# Patient Record
Sex: MALE | Race: WHITE | NOT HISPANIC OR LATINO | URBAN - METROPOLITAN AREA
[De-identification: names, ages, dates, MRNs, and addresses within clinical notes are randomized per-mention and may not be internally consistent; named-entity substitution may affect disease eponyms.]

---

## 2023-08-15 ENCOUNTER — HOSPITAL ENCOUNTER (OUTPATIENT)
Dept: DATA CONVERSION | Facility: HOSPITAL | Age: 68
Discharge: HOME | End: 2023-08-15

## 2023-08-15 DIAGNOSIS — E78.49 OTHER HYPERLIPIDEMIA: ICD-10-CM

## 2023-08-15 DIAGNOSIS — Z00.00 ENCOUNTER FOR GENERAL ADULT MEDICAL EXAMINATION WITHOUT ABNORMAL FINDINGS: ICD-10-CM

## 2023-08-15 DIAGNOSIS — Z12.5 ENCOUNTER FOR SCREENING FOR MALIGNANT NEOPLASM OF PROSTATE: ICD-10-CM

## 2023-08-15 LAB
ALBUMIN SERPL-MCNC: 4.4 GM/DL (ref 3.5–5)
ALBUMIN/GLOB SERPL: 1.4 RATIO (ref 1.5–3)
ALP BLD-CCNC: 96 U/L (ref 35–125)
ALT SERPL-CCNC: 28 U/L (ref 5–40)
ANION GAP SERPL CALCULATED.3IONS-SCNC: 10 MMOL/L (ref 0–19)
APPEARANCE PLAS: CLEAR
AST SERPL-CCNC: 18 U/L (ref 5–40)
BACTERIA UR QL AUTO: NEGATIVE
BILIRUB SERPL-MCNC: 0.8 MG/DL (ref 0.1–1.2)
BILIRUB UR QL STRIP.AUTO: NEGATIVE
BUN SERPL-MCNC: 26 MG/DL (ref 8–25)
BUN/CREAT SERPL: 26 RATIO (ref 8–21)
CALCIUM SERPL-MCNC: 9.2 MG/DL (ref 8.5–10.4)
CHLORIDE SERPL-SCNC: 105 MMOL/L (ref 97–107)
CHOLEST SERPL-MCNC: 226 MG/DL (ref 133–200)
CHOLEST/HDLC SERPL: 4.2 RATIO
CLARITY UR: CLEAR
CO2 SERPL-SCNC: 24 MMOL/L (ref 24–31)
COLOR SPUN FLD: YELLOW
COLOR UR: NORMAL
CREAT SERPL-MCNC: 1 MG/DL (ref 0.4–1.6)
DEPRECATED RDW RBC AUTO: 33.4 FL (ref 37–54)
ERYTHROCYTE [DISTWIDTH] IN BLOOD BY AUTOMATED COUNT: 15.6 % (ref 11.7–15)
FASTING STATUS PATIENT QL REPORTED: ABNORMAL
GFR SERPL CREATININE-BSD FRML MDRD: 82 ML/MIN/1.73 M2
GLOBULIN SER-MCNC: 3.1 G/DL (ref 1.9–3.7)
GLUCOSE SERPL-MCNC: 95 MG/DL (ref 65–99)
GLUCOSE UR STRIP.AUTO-MCNC: NEGATIVE MG/DL
HCT VFR BLD AUTO: 37.4 % (ref 41–50)
HDLC SERPL-MCNC: 54 MG/DL
HGB BLD-MCNC: 11.5 GM/DL (ref 13.5–16.5)
HGB UR QL STRIP.AUTO: NORMAL /HPF (ref 0–3)
HGB UR QL: NEGATIVE
HYALINE CASTS UR QL AUTO: NORMAL /LPF
KETONES UR QL STRIP.AUTO: NEGATIVE
LDLC SERPL CALC-MCNC: 154 MG/DL (ref 65–130)
LEUKOCYTE ESTERASE UR QL STRIP.AUTO: NEGATIVE
MCH RBC QN AUTO: 19.9 PG (ref 26–34)
MCHC RBC AUTO-ENTMCNC: 30.7 % (ref 31–37)
MCV RBC AUTO: 64.6 FL (ref 80–100)
MICROSCOPIC (UA): NORMAL
NITRITE UR QL STRIP.AUTO: NEGATIVE
NRBC BLD-RTO: 0 /100 WBC
PH UR STRIP.AUTO: 5 [PH] (ref 4.6–8)
PLATELET # BLD AUTO: 287 K/UL (ref 150–450)
PMV BLD AUTO: 11.8 CU (ref 7–12.6)
POTASSIUM SERPL-SCNC: 4.9 MMOL/L (ref 3.4–5.1)
PROT SERPL-MCNC: 7.5 G/DL (ref 5.9–7.9)
PROT UR STRIP.AUTO-MCNC: NEGATIVE MG/DL
PSA SERPL-MCNC: 1.4 NG/ML (ref 0–4.1)
RBC # BLD AUTO: 5.79 M/UL (ref 4.5–5.5)
SODIUM SERPL-SCNC: 139 MMOL/L (ref 133–145)
SP GR UR STRIP.AUTO: 1.01 (ref 1–1.03)
SQUAMOUS UR QL AUTO: NORMAL /HPF
TRIGL SERPL-MCNC: 90 MG/DL (ref 40–150)
URINE CULTURE: NORMAL
UROBILINOGEN UR QL STRIP.AUTO: NORMAL MG/DL (ref 0–1)
WBC # BLD AUTO: 7.3 K/UL (ref 4.5–11)
WBC #/AREA URNS AUTO: NORMAL /HPF (ref 0–3)

## 2023-08-18 PROBLEM — H93.13 TINNITUS OF BOTH EARS: Status: ACTIVE | Noted: 2023-08-18

## 2023-08-18 PROBLEM — E78.49 OTHER HYPERLIPIDEMIA: Status: ACTIVE | Noted: 2023-08-18

## 2023-08-18 PROBLEM — F32.A DEPRESSION: Status: ACTIVE | Noted: 2023-08-18

## 2023-08-18 PROBLEM — M47.816 LUMBAR SPONDYLOSIS: Status: ACTIVE | Noted: 2023-08-18

## 2023-08-18 PROBLEM — D56.9 THALASSEMIA: Status: ACTIVE | Noted: 2023-08-18

## 2023-08-18 PROBLEM — H90.3 SENSORINEURAL HEARING LOSS, BILATERAL: Status: ACTIVE | Noted: 2023-08-18

## 2023-08-18 PROBLEM — G89.29 OTHER CHRONIC PAIN: Status: ACTIVE | Noted: 2023-08-18

## 2023-08-18 PROBLEM — G47.00 INSOMNIA: Status: ACTIVE | Noted: 2023-08-18

## 2023-08-18 PROBLEM — F41.9 ANXIETY: Status: ACTIVE | Noted: 2023-08-18

## 2023-08-18 PROBLEM — H53.9 VISUAL DISTURBANCE: Status: ACTIVE | Noted: 2023-08-18

## 2023-08-18 PROBLEM — K21.9 GASTROESOPHAGEAL REFLUX DISEASE: Status: ACTIVE | Noted: 2023-08-18

## 2023-08-18 PROBLEM — R06.00 DYSPNEA: Status: ACTIVE | Noted: 2023-08-18

## 2023-08-18 PROBLEM — M35.3 PMR (POLYMYALGIA RHEUMATICA) (MULTI): Status: ACTIVE | Noted: 2023-08-18

## 2023-08-18 PROBLEM — E55.9 VITAMIN D DEFICIENCY: Status: ACTIVE | Noted: 2023-08-18

## 2023-08-18 PROBLEM — H90.A22 SENSORINEURAL HEARING LOSS (SNHL) OF LEFT EAR WITH RESTRICTED HEARING OF RIGHT EAR: Status: ACTIVE | Noted: 2023-08-18

## 2023-08-18 PROBLEM — I20.9 ANGINA PECTORIS (CMS-HCC): Status: ACTIVE | Noted: 2023-08-18

## 2023-08-18 PROBLEM — I10 ESSENTIAL HYPERTENSION: Status: ACTIVE | Noted: 2023-08-18

## 2023-08-18 PROBLEM — I20.89 ANGINA AT REST (CMS-HCC): Status: ACTIVE | Noted: 2023-08-18

## 2023-08-18 PROBLEM — F41.1 GENERALIZED ANXIETY DISORDER: Status: ACTIVE | Noted: 2023-08-18

## 2023-08-18 PROBLEM — R03.0 ELEVATED BLOOD PRESSURE READING: Status: ACTIVE | Noted: 2023-08-18

## 2023-08-18 PROBLEM — M51.9 LUMBAR DISC DISEASE: Status: ACTIVE | Noted: 2023-08-18

## 2023-08-18 PROBLEM — R42 DIZZINESS: Status: ACTIVE | Noted: 2023-08-18

## 2023-08-18 PROBLEM — R07.89 CHEST DISCOMFORT: Status: ACTIVE | Noted: 2023-08-18

## 2023-08-18 PROBLEM — R20.2 ARM PARESTHESIA, LEFT: Status: ACTIVE | Noted: 2023-08-18

## 2023-08-18 PROBLEM — G44.039 EPISODIC PAROXYSMAL HEMICRANIA, NOT INTRACTABLE: Status: ACTIVE | Noted: 2023-08-18

## 2023-08-18 RX ORDER — CYCLOBENZAPRINE HCL 10 MG
1 TABLET ORAL 2 TIMES DAILY PRN
COMMUNITY
Start: 2023-08-11

## 2023-08-18 RX ORDER — LISINOPRIL 5 MG/1
1 TABLET ORAL DAILY
COMMUNITY
Start: 2021-10-04

## 2023-08-18 RX ORDER — CITALOPRAM 20 MG/1
1 TABLET, FILM COATED ORAL DAILY
COMMUNITY

## 2023-08-18 RX ORDER — ACETAMINOPHEN 500 MG
2 TABLET ORAL EVERY 8 HOURS PRN
COMMUNITY

## 2023-08-18 RX ORDER — PREDNISONE 1 MG/1
1 TABLET ORAL DAILY
COMMUNITY

## 2023-08-18 RX ORDER — PREDNISONE 10 MG/1
1 TABLET ORAL 2 TIMES DAILY
COMMUNITY
Start: 2023-08-11

## 2023-08-18 RX ORDER — ALPRAZOLAM 0.25 MG/1
1 TABLET ORAL 2 TIMES DAILY
COMMUNITY

## 2023-08-18 RX ORDER — LISINOPRIL 10 MG/1
1 TABLET ORAL DAILY
COMMUNITY

## 2023-08-18 RX ORDER — CITALOPRAM 10 MG/1
1 TABLET ORAL NIGHTLY
COMMUNITY
Start: 2021-11-09

## 2023-08-18 RX ORDER — NAPROXEN 500 MG/1
1 TABLET ORAL 2 TIMES DAILY PRN
COMMUNITY
Start: 2016-06-24

## 2023-08-18 RX ORDER — ISOSORBIDE MONONITRATE 10 MG/1
1 TABLET ORAL 2 TIMES DAILY
COMMUNITY
Start: 2021-06-15

## 2023-08-18 RX ORDER — ATORVASTATIN CALCIUM 10 MG/1
1 TABLET, FILM COATED ORAL DAILY
COMMUNITY
End: 2023-11-15 | Stop reason: SDUPTHER

## 2023-10-03 ENCOUNTER — TELEPHONE (OUTPATIENT)
Dept: PRIMARY CARE | Facility: CLINIC | Age: 68
End: 2023-10-03
Payer: MEDICARE

## 2023-10-03 ENCOUNTER — CLINICAL SUPPORT (OUTPATIENT)
Dept: PRIMARY CARE | Facility: CLINIC | Age: 68
End: 2023-10-03
Payer: MEDICARE

## 2023-10-03 DIAGNOSIS — Z23 NEED FOR PROPHYLACTIC VACCINATION AND INOCULATION AGAINST CHOLERA ALONE: ICD-10-CM

## 2023-10-03 PROCEDURE — 90471 IMMUNIZATION ADMIN: CPT | Performed by: FAMILY MEDICINE

## 2023-11-15 ENCOUNTER — TELEPHONE (OUTPATIENT)
Dept: PRIMARY CARE | Facility: CLINIC | Age: 68
End: 2023-11-15
Payer: MEDICARE

## 2023-11-15 DIAGNOSIS — Z76.0 MEDICATION REFILL: ICD-10-CM

## 2023-11-15 RX ORDER — ATORVASTATIN CALCIUM 10 MG/1
10 TABLET, FILM COATED ORAL DAILY
Qty: 90 TABLET | Refills: 1 | Status: SHIPPED | OUTPATIENT
Start: 2023-11-15 | End: 2023-11-17 | Stop reason: WASHOUT

## 2023-11-15 NOTE — TELEPHONE ENCOUNTER
Pt left VM stating that his atorvastatin is running low and he is going out of town soon and wants to know if he needs to continue atorvastatin or if he's done with it now.

## 2023-11-17 DIAGNOSIS — E78.49 OTHER HYPERLIPIDEMIA: ICD-10-CM

## 2023-11-17 RX ORDER — ATORVASTATIN CALCIUM 20 MG/1
20 TABLET, FILM COATED ORAL DAILY
Qty: 90 TABLET | Refills: 3 | Status: SHIPPED | OUTPATIENT
Start: 2023-11-17 | End: 2024-11-16

## 2023-11-17 RX ORDER — ATORVASTATIN CALCIUM 20 MG/1
20 TABLET, FILM COATED ORAL DAILY
COMMUNITY
End: 2023-11-17 | Stop reason: SDUPTHER

## 2023-11-17 NOTE — TELEPHONE ENCOUNTER
Pt states his wife went to  his rx for his atorvastatin. Pt states on the bottle it says 20mg but he should be taking 10 mg. Pt wants to know if this is correct. Please advise

## 2023-11-17 NOTE — TELEPHONE ENCOUNTER
Pt states he was taking 20 mg atorvastatin daiy - per ecw this is correct. New rx that was sent on 11/15/2023 was sent as 10 mg. Pt is wondering if this is an error on our new system or if the provider wants him to start taking the 10 mg. Please advise. Rx is populated now as 20mg- if agreeable to him continuing same dose, please send.

## 2024-07-16 DIAGNOSIS — I10 ESSENTIAL HYPERTENSION: ICD-10-CM

## 2024-07-17 RX ORDER — LISINOPRIL 10 MG/1
10 TABLET ORAL DAILY
Qty: 90 TABLET | Refills: 0 | Status: SHIPPED | OUTPATIENT
Start: 2024-07-17 | End: 2024-07-19

## 2024-09-18 ENCOUNTER — OFFICE VISIT (OUTPATIENT)
Dept: OPHTHALMOLOGY | Facility: CLINIC | Age: 69
End: 2024-09-18
Payer: MEDICARE

## 2024-09-18 DIAGNOSIS — H01.02B SQUAMOUS BLEPHARITIS OF UPPER AND LOWER EYELIDS OF BOTH EYES: ICD-10-CM

## 2024-09-18 DIAGNOSIS — H01.02A SQUAMOUS BLEPHARITIS OF UPPER AND LOWER EYELIDS OF BOTH EYES: ICD-10-CM

## 2024-09-18 DIAGNOSIS — H02.831 DERMATOCHALASIS OF BOTH UPPER EYELIDS: ICD-10-CM

## 2024-09-18 DIAGNOSIS — H52.7 REFRACTIVE ERROR: ICD-10-CM

## 2024-09-18 DIAGNOSIS — H02.834 DERMATOCHALASIS OF BOTH UPPER EYELIDS: ICD-10-CM

## 2024-09-18 DIAGNOSIS — H25.813 COMBINED FORMS OF AGE-RELATED CATARACT OF BOTH EYES: Primary | ICD-10-CM

## 2024-09-18 PROCEDURE — 99214 OFFICE O/P EST MOD 30 MIN: CPT | Performed by: OPHTHALMOLOGY

## 2024-09-18 PROCEDURE — 92015 DETERMINE REFRACTIVE STATE: CPT | Performed by: OPHTHALMOLOGY

## 2024-09-18 PROCEDURE — 99204 OFFICE O/P NEW MOD 45 MIN: CPT | Performed by: OPHTHALMOLOGY

## 2024-09-18 ASSESSMENT — TONOMETRY
IOP_METHOD: GOLDMANN APPLANATION
OD_IOP_MMHG: 18
OS_IOP_MMHG: 18

## 2024-09-18 ASSESSMENT — REFRACTION_MANIFEST
OD_SPHERE: +1.00
OS_SPHERE: +0.25
OS_AXIS: 065
METHOD_AUTOREFRACTION: 1
OD_AXIS: 035
OD_CYLINDER: -1.00
OS_CYLINDER: -0.25

## 2024-09-18 ASSESSMENT — KERATOMETRY
OS_AXISANGLE2_DEGREES: 180
OD_AXISANGLE2_DEGREES: 15
OD_AXISANGLE_DEGREES: 105
OD_K1POWER_DIOPTERS: 44.25
OD_K2POWER_DIOPTERS: 45.25
OS_AXISANGLE_DEGREES: 90
OS_K1POWER_DIOPTERS: 44.50
OS_K2POWER_DIOPTERS: 45.00

## 2024-09-18 ASSESSMENT — ENCOUNTER SYMPTOMS
MUSCULOSKELETAL NEGATIVE: 0
EYES NEGATIVE: 0
CONSTITUTIONAL NEGATIVE: 0
ENDOCRINE NEGATIVE: 0
CARDIOVASCULAR NEGATIVE: 0
ALLERGIC/IMMUNOLOGIC NEGATIVE: 0
HEMATOLOGIC/LYMPHATIC NEGATIVE: 0
NEUROLOGICAL NEGATIVE: 0
PSYCHIATRIC NEGATIVE: 0
RESPIRATORY NEGATIVE: 0
GASTROINTESTINAL NEGATIVE: 0

## 2024-09-18 ASSESSMENT — PATIENT HEALTH QUESTIONNAIRE - PHQ9
1. LITTLE INTEREST OR PLEASURE IN DOING THINGS: NOT AT ALL
SUM OF ALL RESPONSES TO PHQ9 QUESTIONS 1 AND 2: 0
1. LITTLE INTEREST OR PLEASURE IN DOING THINGS: NOT AT ALL
SUM OF ALL RESPONSES TO PHQ9 QUESTIONS 1 AND 2: 0
2. FEELING DOWN, DEPRESSED OR HOPELESS: NOT AT ALL
2. FEELING DOWN, DEPRESSED OR HOPELESS: NOT AT ALL

## 2024-09-18 ASSESSMENT — EXTERNAL EXAM - RIGHT EYE: OD_EXAM: BROW PTOSIS

## 2024-09-18 ASSESSMENT — SLIT LAMP EXAM - LIDS
COMMENTS: 2+ DERMATOCHALASIS - UPPER LID, 1+ BLEPHARITIS
COMMENTS: 2+ DERMATOCHALASIS - UPPER LID, 1+ BLEPHARITIS

## 2024-09-18 ASSESSMENT — CUP TO DISC RATIO
OD_RATIO: 0.15
OS_RATIO: 0.15

## 2024-09-18 ASSESSMENT — VISUAL ACUITY
OD_SC: 20/20
METHOD: SNELLEN - LINEAR
OS_SC+: -2
OS_SC: 20/20

## 2024-09-18 ASSESSMENT — EXTERNAL EXAM - LEFT EYE: OS_EXAM: BROW PTOSIS

## 2024-09-18 ASSESSMENT — PAIN SCALES - GENERAL: PAINLEVEL: 0-NO PAIN

## 2024-09-18 NOTE — PROGRESS NOTES
Subjective   Patient ID: Shivam Pérez is a 69 y.o. male.    Chief Complaint    New Patient Visit       HPI    Complete exam.  No recent changes in health history or meds.  Vision may have worsened over past several years.  Has never seen an ophthal only optoms.    Last edited by Nando Rico MD on 9/18/2024 10:18 AM.        No current outpatient medications on file. (Ophthalmology pharm classes)       Current Outpatient Medications (Other)   Medication Sig Dispense Refill    acetaminophen (TYLENOL ARTHRITIS PAIN ORAL) Tylenol Arthritis Pain      acetaminophen (Tylenol) 500 mg tablet Take 2 tablets (1,000 mg) by mouth every 8 hours if needed for headaches.      atorvastatin (Lipitor) 20 mg tablet Take 1 tablet (20 mg) by mouth once daily. 90 tablet 3    lisinopril 10 mg tablet TAKE ONE TABLET BY MOUTH EVERY DAY 90 tablet 0    naproxen (Naprosyn) 500 mg tablet Take 1 tablet (500 mg) by mouth 2 times a day as needed (for pan).      vitamin D3-vitamin K2 1,250-200 mcg capsule Take by mouth.      ALPRAZolam (Xanax) 0.25 mg tablet Take 1 tablet (0.25 mg) by mouth 2 times a day.      citalopram (CeleXA) 10 mg tablet Take 1 tablet (10 mg) by mouth once daily at bedtime.      citalopram (CeleXA) 20 mg tablet Take 1 tablet (20 mg) by mouth once daily.      cyclobenzaprine (Flexeril) 10 mg tablet Take 1 tablet (10 mg) by mouth 2 times a day as needed.      isosorbide mononitrate 10 mg tablet Take 1 tablet (10 mg) by mouth 2 times a day.      lisinopril 5 mg tablet Take 1 tablet (5 mg) by mouth once daily.      predniSONE (Deltasone) 1 mg tablet Take 1 tablet (1 mg) by mouth once daily.      predniSONE (Deltasone) 10 mg tablet Take 1 tablet (10 mg) by mouth twice a day.         Objective   Base Eye Exam       Visual Acuity (Snellen - Linear)         Right Left Both    Dist sc 20/20 20/20 -2     Near cc   J1+              Tonometry (Goldmann Applanation, 9:22 AM)         Right Left    Pressure 18 18              Pupils          Dark Shape React APD    Right 4 Round 1 None    Left 4 Round 1 None              Extraocular Movement         Right Left     Full Full              Dilation       Both eyes: 1% Tropic 2.5% Phen @ 9:22 AM                  Additional Tests       Keratometry         K1 Axis K2 Axis    Right 44.25 15 45.25 105    Left 44.50 180 45.00 90                  Slit Lamp and Fundus Exam       External Exam         Right Left    External Brow ptosis Brow ptosis              Slit Lamp Exam         Right Left    Lids/Lashes 2+ Dermatochalasis - upper lid, 1+ Blepharitis 2+ Dermatochalasis - upper lid, 1+ Blepharitis    Conjunctiva/Sclera White and quiet White and quiet    Cornea Clear Clear    Anterior Chamber Deep and quiet Deep and quiet    Iris Round and reactive Round and reactive    Lens 1+ Nuclear sclerosis, Inferior 1+ Cortical cataract 1+ Nuclear sclerosis, Inferior 1+ Cortical cataract    Anterior Vitreous Vitreous syneresis Vitreous syneresis              Fundus Exam         Right Left    Disc Normal Normal    C/D Ratio 0.15 0.15    Macula Normal Normal    Vessels Normal Normal    Periphery Normal Normal                  Refraction       Wearing Rx       Type: NONE              Manifest Refraction (Auto)         Sphere Cylinder Axis    Right +1.00 -1.00 035    Left +0.25 -0.25 065              Final Rx         Sphere Cylinder Apex Dist VA Add Near VA    Right +0.75 -0.75 035 20/20 +2.50 20/20    Left +0.25 -0.25 065 20/20 +2.50 20/20      Expiration Date: 9/18/2026    Pupillary Distance: 62                    Assessment/Plan   Problem List Items Addressed This Visit          Eye/Vision problems    Combined forms of age-related cataract of both eyes - Primary     Reassure.  Rx given.  F/u one year full.           Dermatochalasis of both upper eyelids    Squamous blepharitis of upper and lower eyelids of both eyes    Refractive error

## 2024-11-05 DIAGNOSIS — I10 ESSENTIAL HYPERTENSION: ICD-10-CM

## 2024-11-05 RX ORDER — LISINOPRIL 10 MG/1
10 TABLET ORAL DAILY
Qty: 90 TABLET | Refills: 0 | Status: SHIPPED | OUTPATIENT
Start: 2024-11-05

## 2024-11-05 NOTE — TELEPHONE ENCOUNTER
Spoke with patient to confirm the dose he takes as it had Lisinopril 5mg and 10mg listed. Confirmed patient only takes 10mg, removed the 5mg. Last OV 10/3/2023.

## 2024-11-05 NOTE — TELEPHONE ENCOUNTER
PT requesting refill on RX Lisinopril- [facility] in [place]    Former Dr. Ng PT-new patient appointment scheduled with Dr. Pritchett 2/18/2025

## 2024-12-19 DIAGNOSIS — E78.49 OTHER HYPERLIPIDEMIA: ICD-10-CM

## 2024-12-19 RX ORDER — ATORVASTATIN CALCIUM 20 MG/1
20 TABLET, FILM COATED ORAL DAILY
Qty: 90 TABLET | Refills: 0 | Status: SHIPPED | OUTPATIENT
Start: 2024-12-19 | End: 2025-03-19

## 2024-12-19 NOTE — TELEPHONE ENCOUNTER
[facility] Pt lvm stating he noticed he has no refills on his rx asking if this rx should continue last ov  8/3/23 pt has an up coming appt with Dr. Pritchett 2/18/25

## 2024-12-26 ENCOUNTER — PATIENT MESSAGE (OUTPATIENT)
Dept: PRIMARY CARE | Facility: CLINIC | Age: 69
End: 2024-12-26
Payer: MEDICARE

## 2025-01-06 NOTE — PROGRESS NOTES
Subjective   Patient ID: Shivam Pérez is a 69 y.o. male who presents for Medication Reaction (Pt would like to discuss dizziness, muscle aches, joint pain, rapid heart rate, which started 6 months ago, possibly due to cholesterol medication, pt stopped medication about two weeks ago/ nr).    HPI   Pt managed for HTN, HLD, PMR, lumbar DDD, ARIE, insomnia, MDD, thalassemia    Here to discuss muscle aches. Reports hx of PMR diagnosed a few years ago. Tx w/a few mos of prednisone. Had complete resolution of sxs.    Now has been experiencing muscle pain (describes as a dull ache) in the b/l flanks + paravertebral muscles. Ongoing x 6 mos. Pain is present constantly, worse w/physical activity. Feels much different than PMR sxs. Tried discontinuing his atorvastatin which alleviated his sxs of dizziness + tachycardia but did not have resolution of muscle pain.    He denies sensation of muscle spasm, denies weakness. Saw his rheumatologist in August who tested ESR, CRP, CK, uric acid, CMP and CBC, all of which were unremarkable. Given rx for cyclobenzaprine which does not seem to be helping. Minimal relief w/APAP + NSAIDs.    Most bothersome to him at this time is inability to do his routine daily exercise. No family history of neuromuscular disorders.    Review of Systems   Cardiovascular:  Negative for palpitations.   Musculoskeletal:  Positive for myalgias.   Neurological:  Negative for dizziness, weakness and numbness.       Objective   /82   Pulse 68   Wt 91.3 kg (201 lb 3.2 oz)   SpO2 96%   BMI 33.48 kg/m²     Physical Exam  Constitutional:       Appearance: Normal appearance.   HENT:      Head: Normocephalic and atraumatic.   Eyes:      Extraocular Movements: Extraocular movements intact.      Conjunctiva/sclera: Conjunctivae normal.   Cardiovascular:      Rate and Rhythm: Normal rate and regular rhythm.   Pulmonary:      Effort: Pulmonary effort is normal.      Breath sounds: Normal breath sounds. No  wheezing or rhonchi.   Musculoskeletal:      Cervical back: Normal range of motion and neck supple.      Comments: Skin of the back + arms is normal in appearance, warm, dry. ROM b/l arms nml with 5/5 strength. No palpable muscle spasms. Radial pulses 2+   Skin:     General: Skin is warm and dry.   Neurological:      General: No focal deficit present.      Mental Status: He is alert.   Psychiatric:         Mood and Affect: Mood normal.         Assessment/Plan   Problem List Items Addressed This Visit    None  Visit Diagnoses         Codes    Mixed hyperlipidemia    -  Primary E78.2    Coronary artery calcification     I25.10          Pt discontinued atorvastatin but did not have improvement in myalgias. Recommend resuming meds (he will start with 1/2 tab daily) as he does have evidence of CAC on previous CT imaging. He will discuss further w/PCP at upcoming appt.    We discussed neuromuscular referral, EMG testing, physical therapy. Declines at this time. I provided him with the name of a PT who specializes in neurokinetic therapy. Can trial a TENS unit, heating pad. Recommend gentle exercises such as chair yoga to increase physical activity. Also recommend he reach out to his rheumatologist for add'l recommendations as cyclobenzaprine is not helping.     >30 mins spent on pt encounter today

## 2025-01-07 ENCOUNTER — OFFICE VISIT (OUTPATIENT)
Dept: PRIMARY CARE | Facility: CLINIC | Age: 70
End: 2025-01-07
Payer: MEDICARE

## 2025-01-07 VITALS
DIASTOLIC BLOOD PRESSURE: 82 MMHG | SYSTOLIC BLOOD PRESSURE: 142 MMHG | OXYGEN SATURATION: 96 % | HEART RATE: 68 BPM | BODY MASS INDEX: 33.48 KG/M2 | WEIGHT: 201.2 LBS

## 2025-01-07 DIAGNOSIS — M79.10 MUSCLE ACHE: ICD-10-CM

## 2025-01-07 DIAGNOSIS — E78.2 MIXED HYPERLIPIDEMIA: Primary | ICD-10-CM

## 2025-01-07 DIAGNOSIS — I25.10 CORONARY ARTERY CALCIFICATION: ICD-10-CM

## 2025-01-07 PROBLEM — D64.9 ANEMIA: Status: ACTIVE | Noted: 2021-11-09

## 2025-01-07 PROBLEM — F33.41 RECURRENT MAJOR DEPRESSIVE DISORDER, IN PARTIAL REMISSION (CMS-HCC): Chronic | Status: ACTIVE | Noted: 2023-03-22

## 2025-01-07 PROBLEM — R03.0 ELEVATED BLOOD PRESSURE READING: Status: RESOLVED | Noted: 2023-08-18 | Resolved: 2025-01-07

## 2025-01-07 PROBLEM — R07.89 CHEST DISCOMFORT: Status: RESOLVED | Noted: 2023-08-18 | Resolved: 2025-01-07

## 2025-01-07 PROBLEM — I20.89 ANGINA AT REST (CMS-HCC): Status: RESOLVED | Noted: 2023-08-18 | Resolved: 2025-01-07

## 2025-01-07 PROBLEM — F41.9 ANXIETY: Status: RESOLVED | Noted: 2023-08-18 | Resolved: 2025-01-07

## 2025-01-07 PROBLEM — F32.A DEPRESSION: Status: RESOLVED | Noted: 2023-08-18 | Resolved: 2025-01-07

## 2025-01-07 PROBLEM — R06.00 DYSPNEA: Status: RESOLVED | Noted: 2023-08-18 | Resolved: 2025-01-07

## 2025-01-07 PROBLEM — H90.A22 SENSORINEURAL HEARING LOSS (SNHL) OF LEFT EAR WITH RESTRICTED HEARING OF RIGHT EAR: Status: RESOLVED | Noted: 2023-08-18 | Resolved: 2025-01-07

## 2025-01-07 PROBLEM — R20.2 ARM PARESTHESIA, LEFT: Status: RESOLVED | Noted: 2023-08-18 | Resolved: 2025-01-07

## 2025-01-07 PROCEDURE — 99214 OFFICE O/P EST MOD 30 MIN: CPT | Performed by: PHYSICIAN ASSISTANT

## 2025-01-07 PROCEDURE — 3079F DIAST BP 80-89 MM HG: CPT | Performed by: PHYSICIAN ASSISTANT

## 2025-01-07 PROCEDURE — 1159F MED LIST DOCD IN RCRD: CPT | Performed by: PHYSICIAN ASSISTANT

## 2025-01-07 PROCEDURE — 1124F ACP DISCUSS-NO DSCNMKR DOCD: CPT | Performed by: PHYSICIAN ASSISTANT

## 2025-01-07 PROCEDURE — 1160F RVW MEDS BY RX/DR IN RCRD: CPT | Performed by: PHYSICIAN ASSISTANT

## 2025-01-07 PROCEDURE — 1036F TOBACCO NON-USER: CPT | Performed by: PHYSICIAN ASSISTANT

## 2025-01-07 PROCEDURE — 3077F SYST BP >= 140 MM HG: CPT | Performed by: PHYSICIAN ASSISTANT

## 2025-01-07 PROCEDURE — 1126F AMNT PAIN NOTED NONE PRSNT: CPT | Performed by: PHYSICIAN ASSISTANT

## 2025-01-07 ASSESSMENT — ENCOUNTER SYMPTOMS
NUMBNESS: 0
PALPITATIONS: 0
MYALGIAS: 1
DIZZINESS: 0
WEAKNESS: 0

## 2025-01-07 ASSESSMENT — PATIENT HEALTH QUESTIONNAIRE - PHQ9
SUM OF ALL RESPONSES TO PHQ9 QUESTIONS 1 AND 2: 0
1. LITTLE INTEREST OR PLEASURE IN DOING THINGS: NOT AT ALL
2. FEELING DOWN, DEPRESSED OR HOPELESS: NOT AT ALL

## 2025-01-07 ASSESSMENT — COLUMBIA-SUICIDE SEVERITY RATING SCALE - C-SSRS
6. HAVE YOU EVER DONE ANYTHING, STARTED TO DO ANYTHING, OR PREPARED TO DO ANYTHING TO END YOUR LIFE?: NO
2. HAVE YOU ACTUALLY HAD ANY THOUGHTS OF KILLING YOURSELF?: NO
1. IN THE PAST MONTH, HAVE YOU WISHED YOU WERE DEAD OR WISHED YOU COULD GO TO SLEEP AND NOT WAKE UP?: NO

## 2025-01-07 ASSESSMENT — PAIN SCALES - GENERAL: PAINLEVEL_OUTOF10: 0-NO PAIN

## 2025-01-07 NOTE — PATIENT INSTRUCTIONS
Sometimes, the brain loses its communication with certain muscles, causing these muscles to become weak.  To compensate for this, other surrounding muscles have to work harder, which can lead to muscle fatigue and frequent spasms. Neurokinetic therapy (NKT) is a type of physical therapy that focuses on re-establishing the connection between the brain and the muscles.     To learn more about NKT or to schedule an appointment, please call:  Patito Blake [facility].  [phone] [email or web address]

## 2025-02-11 ENCOUNTER — OFFICE VISIT (OUTPATIENT)
Dept: PRIMARY CARE | Facility: CLINIC | Age: 70
End: 2025-02-11
Payer: MEDICARE

## 2025-02-11 VITALS
SYSTOLIC BLOOD PRESSURE: 122 MMHG | TEMPERATURE: 96.4 F | OXYGEN SATURATION: 99 % | HEIGHT: 65 IN | HEART RATE: 85 BPM | BODY MASS INDEX: 33.15 KG/M2 | DIASTOLIC BLOOD PRESSURE: 82 MMHG | WEIGHT: 199 LBS

## 2025-02-11 DIAGNOSIS — Z12.5 SCREENING PSA (PROSTATE SPECIFIC ANTIGEN): ICD-10-CM

## 2025-02-11 DIAGNOSIS — M25.50 ARTHRALGIA, UNSPECIFIED JOINT: ICD-10-CM

## 2025-02-11 DIAGNOSIS — M79.10 MYALGIA: ICD-10-CM

## 2025-02-11 DIAGNOSIS — Z87.891 PERSONAL HISTORY OF NICOTINE DEPENDENCE: ICD-10-CM

## 2025-02-11 DIAGNOSIS — Z00.00 MEDICARE ANNUAL WELLNESS VISIT, SUBSEQUENT: Primary | ICD-10-CM

## 2025-02-11 DIAGNOSIS — Z00.00 ANNUAL PHYSICAL EXAM: ICD-10-CM

## 2025-02-11 DIAGNOSIS — E55.9 VITAMIN D DEFICIENCY: ICD-10-CM

## 2025-02-11 DIAGNOSIS — Z13.6 SCREENING FOR CARDIOVASCULAR CONDITION: ICD-10-CM

## 2025-02-11 DIAGNOSIS — D50.9 MICROCYTIC ANEMIA: ICD-10-CM

## 2025-02-11 DIAGNOSIS — E80.6 HYPERBILIRUBINEMIA: ICD-10-CM

## 2025-02-11 PROCEDURE — G0439 PPPS, SUBSEQ VISIT: HCPCS | Performed by: STUDENT IN AN ORGANIZED HEALTH CARE EDUCATION/TRAINING PROGRAM

## 2025-02-11 PROCEDURE — 3008F BODY MASS INDEX DOCD: CPT | Performed by: STUDENT IN AN ORGANIZED HEALTH CARE EDUCATION/TRAINING PROGRAM

## 2025-02-11 PROCEDURE — 99215 OFFICE O/P EST HI 40 MIN: CPT | Performed by: STUDENT IN AN ORGANIZED HEALTH CARE EDUCATION/TRAINING PROGRAM

## 2025-02-11 PROCEDURE — 99214 OFFICE O/P EST MOD 30 MIN: CPT | Performed by: STUDENT IN AN ORGANIZED HEALTH CARE EDUCATION/TRAINING PROGRAM

## 2025-02-11 PROCEDURE — 1159F MED LIST DOCD IN RCRD: CPT | Performed by: STUDENT IN AN ORGANIZED HEALTH CARE EDUCATION/TRAINING PROGRAM

## 2025-02-11 PROCEDURE — 3074F SYST BP LT 130 MM HG: CPT | Performed by: STUDENT IN AN ORGANIZED HEALTH CARE EDUCATION/TRAINING PROGRAM

## 2025-02-11 PROCEDURE — 3079F DIAST BP 80-89 MM HG: CPT | Performed by: STUDENT IN AN ORGANIZED HEALTH CARE EDUCATION/TRAINING PROGRAM

## 2025-02-11 PROCEDURE — 1124F ACP DISCUSS-NO DSCNMKR DOCD: CPT | Performed by: STUDENT IN AN ORGANIZED HEALTH CARE EDUCATION/TRAINING PROGRAM

## 2025-02-11 PROCEDURE — 1125F AMNT PAIN NOTED PAIN PRSNT: CPT | Performed by: STUDENT IN AN ORGANIZED HEALTH CARE EDUCATION/TRAINING PROGRAM

## 2025-02-11 PROCEDURE — 99214 OFFICE O/P EST MOD 30 MIN: CPT | Mod: 25 | Performed by: STUDENT IN AN ORGANIZED HEALTH CARE EDUCATION/TRAINING PROGRAM

## 2025-02-11 PROCEDURE — 1160F RVW MEDS BY RX/DR IN RCRD: CPT | Performed by: STUDENT IN AN ORGANIZED HEALTH CARE EDUCATION/TRAINING PROGRAM

## 2025-02-11 RX ORDER — ATORVASTATIN CALCIUM 20 MG/1
20 TABLET, FILM COATED ORAL DAILY
COMMUNITY
End: 2025-02-16 | Stop reason: WASHOUT

## 2025-02-11 RX ORDER — CYCLOBENZAPRINE HCL 10 MG
10 TABLET ORAL 3 TIMES DAILY PRN
COMMUNITY

## 2025-02-11 RX ORDER — PREDNISONE 10 MG/1
10 TABLET ORAL DAILY
COMMUNITY

## 2025-02-11 RX ORDER — BISMUTH SUBSALICYLATE 262 MG
1 TABLET,CHEWABLE ORAL DAILY
COMMUNITY

## 2025-02-11 ASSESSMENT — ANXIETY QUESTIONNAIRES
1. FEELING NERVOUS, ANXIOUS, OR ON EDGE: NOT AT ALL
IF YOU CHECKED OFF ANY PROBLEMS ON THIS QUESTIONNAIRE, HOW DIFFICULT HAVE THESE PROBLEMS MADE IT FOR YOU TO DO YOUR WORK, TAKE CARE OF THINGS AT HOME, OR GET ALONG WITH OTHER PEOPLE: NOT DIFFICULT AT ALL
3. WORRYING TOO MUCH ABOUT DIFFERENT THINGS: NOT AT ALL
4. TROUBLE RELAXING: NOT AT ALL
GAD7 TOTAL SCORE: 0
6. BECOMING EASILY ANNOYED OR IRRITABLE: NOT AT ALL
5. BEING SO RESTLESS THAT IT IS HARD TO SIT STILL: NOT AT ALL
2. NOT BEING ABLE TO STOP OR CONTROL WORRYING: NOT AT ALL
7. FEELING AFRAID AS IF SOMETHING AWFUL MIGHT HAPPEN: NOT AT ALL

## 2025-02-11 ASSESSMENT — PATIENT HEALTH QUESTIONNAIRE - PHQ9
1. LITTLE INTEREST OR PLEASURE IN DOING THINGS: NOT AT ALL
SUM OF ALL RESPONSES TO PHQ9 QUESTIONS 1 AND 2: 0
2. FEELING DOWN, DEPRESSED OR HOPELESS: NOT AT ALL

## 2025-02-11 ASSESSMENT — PAIN SCALES - GENERAL: PAINLEVEL_OUTOF10: 5

## 2025-02-11 NOTE — PROGRESS NOTES
"MEDICARE WELLNESS    Chief Complaint   Patient presents with    Annual Exam       HPI:  Shivam Pérez is a 69 y.o. male here  for a a Medicare Wellness Visit.    Recent office visit with Consuelo Green on 1/7/2025 with concern for muscle aches.  History of PMR, follows with rheumatology.  He had discontinued his atorvastatin prior to this appointment but did not note any improvement in myalgias, he was advised to resume this medication starting with half tablet daily due to evidence of CAC on previous CT imaging.  Neuromuscular referral, EMG testing and physical therapy were also offered but declined.    Stopped taking after Spencer until he saw Consuelo.    Split 20 mg and went 10     Joint pain after eating shellfish (shrimp0.    Hyperlipidemia:  Lab Results   Component Value Date    CHOL 226 (H) 08/15/2023    CHOL 182 07/29/2021     Lab Results   Component Value Date    HDL 54 08/15/2023    HDL 50 07/29/2021     Lab Results   Component Value Date    LDLCALC 154 (H) 08/15/2023    LDLCALC 110 07/29/2021     Lab Results   Component Value Date    TRIG 90 08/15/2023    TRIG 109 07/29/2021     No components found for: \"CHOLHDL\"      Health Maintenance    Other Health Care Providers:  Medical record reviewed and discussed with patient.  Rheumatology:  Sajan  Podiatry:  Tangela    Social History:  Tobacco:  former smoker, quit 20 years ago  EtOH::  occasional  Patient reports routine vision checks and dental cleanings, and regular exercise/physical activity as tolerated.     Family History:  Family History   Problem Relation Name Age of Onset    Osteoporosis Mother      Other (perforated bowel) Father         Advanced Directives:  Patient DOES NOT have advanced directives in place.  Counseled and discussed importance with patient during visit today.  Provided assistance as necessary.    Opioid Medications:  Does the patient use opioid medications: NO  Name of medication: N/A  If yes, do they take this medicine " appropriately: N/A    Overall Health:  How does the patient rate their health status today:  GOOD    Cognitive Screen:  AAAx3  to person, place and time: YES  3 word recall: Banana, Chair, Sunrise  - Immediate recall: YES  - 5 minutes recall: YES  Impression: No cognitive deficiency observed during screening or encounter today     Vision/Hearing Screen:  Vision:  No acute vision concerns at visit today.  Hearing screen: reports no difficulty with hearing and passes finger rub test bilaterally    Immunizations:  COVID:  DUE  Flu:  DUE  Tdap: utd  Pneumonia:  utd  Shingrix:  utd    Immunization History   Administered Date(s) Administered    COVID-19, mRNA, LNP-S, PF, 30 mcg/0.3 mL dose 03/22/2021, 04/12/2021, 11/10/2021    Flu vaccine, quadrivalent, high-dose, preservative free, age 65y+ (FLUZONE) 10/25/2021, 10/19/2022    Influenza, Unspecified 09/16/2021    Pfizer COVID-19 vaccine, bivalent, age 12 years and older (30 mcg/0.3 mL) 10/19/2022    Pneumococcal conjugate vaccine, 20-valent (PREVNAR 20) 07/17/2023    Pneumococcal polysaccharide vaccine, 23-valent, age 2 years and older (PNEUMOVAX 23) 07/29/2021    Tdap vaccine, age 7 year and older (BOOSTRIX, ADACEL) 11/10/2020    Zoster vaccine, recombinant, adult (SHINGRIX) 08/03/2023, 10/03/2023       Screenings:  HCV:  DUE  PSA:  DUE  Colonoscopy:  12/29/2017 wnl, 10 year follow up - utd  AAA:  former smoker -DUE  Lung:  not currently indicated    Reviewed:   Past Medical History/Allergies:  Yes  Family History:  Yes  Social History:  Yes  Current Medications:  Yes  Vital Signs:  Yes  Advanced Directives:  discussed  Immunizations:  reviewed today  Home Safety:                    Up & Go test > 30 seconds?  No                   Home have rugs; lack grab bars in bathroom; lack handrail on stairs; have poor lighting?  No                   Hearing difficulties?  No  Geriatric Assessment           ADL areas requiring assistance:  Does not need help with , Dressing,  Eating, Ambulating, Toileting, Grooming, Hygiene.            IADL areas requiring assistance:  Does not need help with , Shopping, Housework, Accounting, Transportation, Driving.   Medications reviewed           Current supplements  Reviewed and recorded.     PHQ9/GAD7:  Over the last 2 weeks, how often have you been bothered by any of the following problems?  Feeling nervous, anxious, or on edge: Not at all  Not being able to stop or control worrying: Not at all  Worrying too much about different things: Not at all  Trouble relaxing: Not at all  Being so restless that it is hard to sit still: Not at all  Becoming easily annoyed or irritable: Not at all  Feeling afraid as if something awful might happen: Not at all  ARIE-7 Total Score: 0      Current Medications  Current Outpatient Medications   Medication Instructions    acetaminophen (TYLENOL ARTHRITIS PAIN ORAL) Tylenol Arthritis Pain    acetaminophen (Tylenol) 500 mg tablet 2 tablets, Every 8 hours PRN    atorvastatin (LIPITOR) 20 mg, Daily    cyclobenzaprine (FLEXERIL) 10 mg, 3 times daily PRN    isosorbide mononitrate 10 mg tablet 1 tablet, 2 times daily    lisinopril 10 mg, oral, Daily    multivitamin tablet 1 tablet, Daily    naproxen (Naprosyn) 500 mg tablet 1 tablet, 2 times daily PRN    predniSONE (DELTASONE) 10 mg, Daily    vitamin D3-vitamin K2 1,250-200 mcg capsule Take by mouth.        History  Allergies   Allergen Reactions    Bee Venom Protein (Honey Bee) Unknown    Escitalopram Oxalate Unknown      Past Medical History:   Diagnosis Date    Acute stress reaction     Acute stress reaction    Personal history of diseases of the blood and blood-forming organs and certain disorders involving the immune mechanism     History of thalassemia    Personal history of other diseases of the circulatory system     History of hypertension    Personal history of other diseases of the musculoskeletal system and connective tissue     History of polymyalgia rheumatica     Personal history of other mental and behavioral disorders     History of anxiety    Unspecified thoracic, thoracolumbar and lumbosacral intervertebral disc disorder     Lumbar disc disease      Past Surgical History:   Procedure Laterality Date    OTHER SURGICAL HISTORY  12/14/2020    Knee surgery     Family History   Problem Relation Name Age of Onset    Osteoporosis Mother      Other (perforated bowel) Father       Social History     Tobacco Use    Smoking status: Former     Types: Cigarettes    Smokeless tobacco: Never   Substance Use Topics    Alcohol use: Yes    Drug use: Never     Tobacco Use: Medium Risk (2/11/2025)    Patient History     Smoking Tobacco Use: Former     Smokeless Tobacco Use: Never     Passive Exposure: Not on file        ROS  All pertinent positive symptoms are included in the history of present illness.  All other systems have been reviewed and are negative and noncontributory to this patient's current ailments.    VITAL SIGNS  Vitals:    02/11/25 1133   BP: 122/82   Pulse: 85   Temp: 35.8 °C (96.4 °F)   SpO2: 99%     Vitals:    02/11/25 1133   Weight: 90.3 kg (199 lb)      Body mass index is 33.12 kg/m².     PHYSICAL EXAM    GENERAL  Well-appearing, pleasant and cooperative.  No acute distress.    HEENT  HEAD:   Normocephalic.  Atraumatic.  EYES:  PERRLA.  No scleral icterus or conjunctival injection.  EARS:  Tympanic membranes visualized bilaterally without erythema, fluid, or bulging.  NECK:  No adenopathy.  No palpable thyroid enlargement or nodules.    THROAT:  Moist oropharynx without tonsillar enlargement or exudates.    LUNGS:    Clear to auscultation bilaterally.  No wheezes, rales, rhonchi.    CARDIAC:  Regular rate and rhythm.  Normal S1S2.  No murmurs/rubs/gallops.    ABDOMEN:  Soft, non-tender, non-distended.  No hepatosplenomegaly.  Normoactive bowel sounds.    MUSCULOSKELETAL:  No gross abnormalities.   No joint swelling or erythema,.  No spinal or paraspinal tenderness to  palpation.    EXTREMITIES:  No LE edema or cyanosis.      NEURO           Alert and oriented x3. No focal deficits.    PSYCH:          Affect appropriate.     Preventative Services reviewed with patient, instructions provided    Assessment/Plan   Problem List Items Addressed This Visit       Vitamin D deficiency    Relevant Orders    Vitamin D 25-Hydroxy,Total (for eval of Vitamin D levels)     Other Visit Diagnoses       Medicare annual wellness visit, subsequent    -  Primary    Annual physical exam        Relevant Orders    Lipid Panel    Hepatitis C Antibody    HIV 1/2 Antigen/Antibody Screen with Reflex to Confirmation    TSH with reflex to Free T4 if abnormal    Comprehensive Metabolic Panel    CBC    Screening for cardiovascular condition        Relevant Orders    Lipid Panel    Screening PSA (prostate specific antigen)        Relevant Orders    Prostate Specific Antigen, Screen    Personal history of nicotine dependence        Relevant Orders    Vascular US abdominal aorta anuerysm AAA screening    Myalgia        Relevant Orders    CBC    Arthralgia, unspecified joint        Relevant Orders    Uric acid          Counseling:   Medication education:    Education: The patient is counseled regarding potential side effects of all new medications.    Understanding:  Patient expressed understanding.    Adherence:  No barriers to adherence identified.         Radha Pritchett MD

## 2025-02-11 NOTE — PATIENT INSTRUCTIONS
Was a pleasure to meet you today.    Go to the lab for fasting blood work, we will notify you with all results.    Ultrasound ordered for AAA screening (abdominal aortic aneurysm).  Call to schedule.    Okay to continue cessation of atorvastatin for now.  We will check your cholesterol levels with the blood work and decide on next steps.    Follow-up yearly for Medicare wellness visit, further follow-up pending lab results.

## 2025-02-12 DIAGNOSIS — I10 ESSENTIAL HYPERTENSION: ICD-10-CM

## 2025-02-12 RX ORDER — LISINOPRIL 10 MG/1
10 TABLET ORAL DAILY
Qty: 90 TABLET | Refills: 0 | OUTPATIENT
Start: 2025-02-12

## 2025-02-13 RX ORDER — LISINOPRIL 10 MG/1
10 TABLET ORAL DAILY
Qty: 90 TABLET | Refills: 0 | Status: SHIPPED | OUTPATIENT
Start: 2025-02-13

## 2025-02-17 ENCOUNTER — ANCILLARY PROCEDURE (OUTPATIENT)
Dept: VASCULAR MEDICINE | Facility: CLINIC | Age: 70
End: 2025-02-17
Payer: MEDICARE

## 2025-02-17 DIAGNOSIS — Z13.6 ENCOUNTER FOR SCREENING FOR CARDIOVASCULAR DISORDERS: ICD-10-CM

## 2025-02-17 DIAGNOSIS — Z87.891 PERSONAL HISTORY OF NICOTINE DEPENDENCE: ICD-10-CM

## 2025-02-17 PROCEDURE — 76706 US ABDL AORTA SCREEN AAA: CPT

## 2025-02-17 PROCEDURE — 76706 US ABDL AORTA SCREEN AAA: CPT | Performed by: SURGERY

## 2025-02-18 ENCOUNTER — TELEPHONE (OUTPATIENT)
Dept: PRIMARY CARE | Facility: CLINIC | Age: 70
End: 2025-02-18
Payer: MEDICARE

## 2025-02-18 ENCOUNTER — APPOINTMENT (OUTPATIENT)
Dept: PRIMARY CARE | Facility: CLINIC | Age: 70
End: 2025-02-18
Payer: MEDICARE

## 2025-02-18 DIAGNOSIS — E78.2 MIXED HYPERLIPIDEMIA: Primary | ICD-10-CM

## 2025-02-18 LAB
25(OH)D3+25(OH)D2 SERPL-MCNC: 38 NG/ML (ref 30–100)
ALBUMIN SERPL-MCNC: 4.5 G/DL (ref 3.6–5.1)
ALP SERPL-CCNC: 76 U/L (ref 35–144)
ALT SERPL-CCNC: 17 U/L (ref 9–46)
ANION GAP SERPL CALCULATED.4IONS-SCNC: 8 MMOL/L (CALC) (ref 7–17)
AST SERPL-CCNC: 15 U/L (ref 10–35)
BILIRUB SERPL-MCNC: 1.3 MG/DL (ref 0.2–1.2)
BUN SERPL-MCNC: 27 MG/DL (ref 7–25)
CALCIUM SERPL-MCNC: 9.4 MG/DL (ref 8.6–10.3)
CHLORIDE SERPL-SCNC: 105 MMOL/L (ref 98–110)
CHOLEST SERPL-MCNC: 205 MG/DL
CHOLEST/HDLC SERPL: 4.2 (CALC)
CO2 SERPL-SCNC: 28 MMOL/L (ref 20–32)
CREAT SERPL-MCNC: 0.83 MG/DL (ref 0.7–1.35)
EGFRCR SERPLBLD CKD-EPI 2021: 95 ML/MIN/1.73M2
ERYTHROCYTE [DISTWIDTH] IN BLOOD BY AUTOMATED COUNT: 16.8 % (ref 11–15)
GLUCOSE SERPL-MCNC: 91 MG/DL (ref 65–99)
HCT VFR BLD AUTO: 39.9 % (ref 38.5–50)
HCV AB SERPL QL IA: NORMAL
HDLC SERPL-MCNC: 49 MG/DL
HGB BLD-MCNC: 12 G/DL (ref 13.2–17.1)
HIV 1+2 AB+HIV1 P24 AG SERPL QL IA: NORMAL
LDLC SERPL CALC-MCNC: 130 MG/DL (CALC)
MCH RBC QN AUTO: 20 PG (ref 27–33)
MCHC RBC AUTO-ENTMCNC: 30.1 G/DL (ref 32–36)
MCV RBC AUTO: 66.5 FL (ref 80–100)
NONHDLC SERPL-MCNC: 156 MG/DL (CALC)
PLATELET # BLD AUTO: 319 THOUSAND/UL (ref 140–400)
PMV BLD REES-ECKER: ABNORMAL FL
POTASSIUM SERPL-SCNC: 4.2 MMOL/L (ref 3.5–5.3)
PROT SERPL-MCNC: 7 G/DL (ref 6.1–8.1)
PSA SERPL-MCNC: 1.45 NG/ML
RBC # BLD AUTO: 6 MILLION/UL (ref 4.2–5.8)
SODIUM SERPL-SCNC: 141 MMOL/L (ref 135–146)
TRIGL SERPL-MCNC: 149 MG/DL
TSH SERPL-ACNC: 1.65 MIU/L (ref 0.4–4.5)
URATE SERPL-MCNC: 6.2 MG/DL (ref 4–8)
WBC # BLD AUTO: 7.3 THOUSAND/UL (ref 3.8–10.8)

## 2025-02-18 NOTE — TELEPHONE ENCOUNTER
Pt lvm stating he is calling for his results stating him and the physician decided not to take the atorvastatin stating crestor was recommended. Asking for a call back

## 2025-02-18 NOTE — TELEPHONE ENCOUNTER
Pt stating he thought the atorvastatin was giving him side effects so him as well as the physician made the dicesion to hold off.  pt stating physician suggested the crestor asking if physician decided which stating would be good to start on please advise

## 2025-02-18 NOTE — TELEPHONE ENCOUNTER
Pt made aware of recent labs and given physician recommendations. Waiting to find out about which stating would be good to take in replace of the atorvastatin Pt stating he will have both labs completed in 3 months he is feeling fine but also states he has a blood disorder that plays a part in his anemia/ iron issues staing he has already had this addressed there is nothing that could be done about this.

## 2025-02-18 NOTE — TELEPHONE ENCOUNTER
I would recommend we try Crestor (rosuvastatin).  If he thinks this medication also may contribute to his symptoms, we could try pravastatin however I would like to try Crestor first since like atorvastatin it is one of the most effective at lowering cholesterol levels.  Will send prescription if patient is still agreeable.

## 2025-02-19 RX ORDER — ROSUVASTATIN CALCIUM 10 MG/1
10 TABLET, COATED ORAL DAILY
Qty: 90 TABLET | Refills: 1 | Status: SHIPPED | OUTPATIENT
Start: 2025-02-19 | End: 2025-08-18

## 2025-02-19 NOTE — TELEPHONE ENCOUNTER
Crestor (rosuvastatin) prescription sent.  I also ordered a future lipid panel to complete in 3 months after starting this medication.

## 2025-05-14 LAB
CHOLEST SERPL-MCNC: 117 MG/DL
CHOLEST/HDLC SERPL: 2.3 (CALC)
HDLC SERPL-MCNC: 50 MG/DL
LDLC SERPL CALC-MCNC: 50 MG/DL (CALC)
NONHDLC SERPL-MCNC: 67 MG/DL (CALC)
TRIGL SERPL-MCNC: 89 MG/DL

## 2025-05-19 DIAGNOSIS — E78.2 MIXED HYPERLIPIDEMIA: ICD-10-CM

## 2025-07-28 ENCOUNTER — APPOINTMENT (OUTPATIENT)
Dept: CARDIOLOGY | Facility: CLINIC | Age: 70
End: 2025-07-28
Payer: MEDICARE

## 2025-07-28 VITALS
OXYGEN SATURATION: 94 % | SYSTOLIC BLOOD PRESSURE: 121 MMHG | DIASTOLIC BLOOD PRESSURE: 67 MMHG | BODY MASS INDEX: 28.93 KG/M2 | WEIGHT: 180 LBS | HEIGHT: 66 IN | HEART RATE: 75 BPM

## 2025-07-28 DIAGNOSIS — E78.49 OTHER HYPERLIPIDEMIA: ICD-10-CM

## 2025-07-28 DIAGNOSIS — E78.2 MIXED HYPERLIPIDEMIA: ICD-10-CM

## 2025-07-28 DIAGNOSIS — I10 ESSENTIAL HYPERTENSION: Primary | ICD-10-CM

## 2025-07-28 DIAGNOSIS — I20.9 ANGINA PECTORIS: ICD-10-CM

## 2025-07-28 PROCEDURE — G2211 COMPLEX E/M VISIT ADD ON: HCPCS | Performed by: INTERNAL MEDICINE

## 2025-07-28 PROCEDURE — 3008F BODY MASS INDEX DOCD: CPT | Performed by: INTERNAL MEDICINE

## 2025-07-28 PROCEDURE — 1160F RVW MEDS BY RX/DR IN RCRD: CPT | Performed by: INTERNAL MEDICINE

## 2025-07-28 PROCEDURE — 1126F AMNT PAIN NOTED NONE PRSNT: CPT | Performed by: INTERNAL MEDICINE

## 2025-07-28 PROCEDURE — 3074F SYST BP LT 130 MM HG: CPT | Performed by: INTERNAL MEDICINE

## 2025-07-28 PROCEDURE — 99204 OFFICE O/P NEW MOD 45 MIN: CPT | Performed by: INTERNAL MEDICINE

## 2025-07-28 PROCEDURE — 3078F DIAST BP <80 MM HG: CPT | Performed by: INTERNAL MEDICINE

## 2025-07-28 PROCEDURE — 1159F MED LIST DOCD IN RCRD: CPT | Performed by: INTERNAL MEDICINE

## 2025-07-28 RX ORDER — ROSUVASTATIN CALCIUM 10 MG/1
10 TABLET, COATED ORAL DAILY
Qty: 90 TABLET | Refills: 3 | Status: SHIPPED | OUTPATIENT
Start: 2025-07-28 | End: 2026-07-28

## 2025-07-28 RX ORDER — LISINOPRIL 10 MG/1
10 TABLET ORAL DAILY
Qty: 90 TABLET | Refills: 3 | Status: SHIPPED | OUTPATIENT
Start: 2025-07-28 | End: 2026-07-28

## 2025-07-28 ASSESSMENT — ENCOUNTER SYMPTOMS
OCCASIONAL FEELINGS OF UNSTEADINESS: 0
DEPRESSION: 0
LOSS OF SENSATION IN FEET: 0

## 2025-07-28 ASSESSMENT — LIFESTYLE VARIABLES
HAVE YOU OR SOMEONE ELSE BEEN INJURED AS A RESULT OF YOUR DRINKING: NO
AUDIT TOTAL SCORE: 3
HOW OFTEN DURING THE LAST YEAR HAVE YOU HAD A FEELING OF GUILT OR REMORSE AFTER DRINKING: NEVER
HOW OFTEN DURING THE LAST YEAR HAVE YOU FAILED TO DO WHAT WAS NORMALLY EXPECTED FROM YOU BECAUSE OF DRINKING: NEVER
HOW OFTEN DURING THE LAST YEAR HAVE YOU FOUND THAT YOU WERE NOT ABLE TO STOP DRINKING ONCE YOU HAD STARTED: NEVER
HOW OFTEN DO YOU HAVE A DRINK CONTAINING ALCOHOL: 2-4 TIMES A MONTH
SKIP TO QUESTIONS 9-10: 0
HOW OFTEN DURING THE LAST YEAR HAVE YOU BEEN UNABLE TO REMEMBER WHAT HAPPENED THE NIGHT BEFORE BECAUSE YOU HAD BEEN DRINKING: NEVER
HOW OFTEN DO YOU HAVE SIX OR MORE DRINKS ON ONE OCCASION: NEVER
AUDIT-C TOTAL SCORE: 3
HOW OFTEN DURING THE LAST YEAR HAVE YOU NEEDED AN ALCOHOLIC DRINK FIRST THING IN THE MORNING TO GET YOURSELF GOING AFTER A NIGHT OF HEAVY DRINKING: NEVER
HOW MANY STANDARD DRINKS CONTAINING ALCOHOL DO YOU HAVE ON A TYPICAL DAY: 3 OR 4
HAS A RELATIVE, FRIEND, DOCTOR, OR ANOTHER HEALTH PROFESSIONAL EXPRESSED CONCERN ABOUT YOUR DRINKING OR SUGGESTED YOU CUT DOWN: NO

## 2025-07-28 ASSESSMENT — PAIN SCALES - GENERAL: PAINLEVEL_OUTOF10: 0-NO PAIN

## 2025-07-28 NOTE — PROGRESS NOTES
[facility] [place]        Subjective   Chief Complaint   Patient presents with    ER Follow-up     Previous patient went to ER for Dizziness and chest discomfort.       70-year patient with multiple comorbid condition.  Currently on lisinopril, multivitamins and Naprosyn.  Abdominal aortic ultrasound was done essentially no evidence of abdominal aortic aneurysm seen about 5 months ago.  Comprehensive lab was done month ago unremarkable.  CBC was unremarkable except hemoglobin was 12 g.  Lipid panel was done 2 months ago total cholesterol 117 with LDL is 50.  Hemoglobin A1c was done 4 years ago 5.8%.  Patient with underlying history of PMR or polymyalgia rheumatica with anxiety.  Went to emergency room with midsternal chest pain tightness about a month ago.  Unremarkable finding including troponins normal.  EKG was unremarkable also.    He has a past medical history of Acute stress reaction, Hypertension, Personal history of diseases of the blood and blood-forming organs and certain disorders involving the immune mechanism, Personal history of other diseases of the circulatory system, Personal history of other diseases of the musculoskeletal system and connective tissue, Personal history of other mental and behavioral disorders, and Unspecified thoracic, thoracolumbar and lumbosacral intervertebral disc disorder.  He has a past surgical history that includes Other surgical history (12/14/2020).   No relevant family history has been documented for this patient.  Current Outpatient Medications   Medication Sig Dispense Refill    acetaminophen (TYLENOL ARTHRITIS PAIN ORAL) Tylenol Arthritis Pain      cyclobenzaprine (Flexeril) 10 mg tablet Take 1 tablet (10 mg) by mouth 3 times a day as needed for muscle spasms. As needed      lisinopril 10 mg tablet Take 1 tablet (10 mg) by mouth once daily. 90 tablet 0    multivitamin tablet Take 1 tablet by mouth once daily.      naproxen  "(Naprosyn) 500 mg tablet Take 1 tablet (500 mg) by mouth 2 times a day as needed (for pan).      rosuvastatin (Crestor) 10 mg tablet Take 1 tablet (10 mg) by mouth once daily. 90 tablet 1     No current facility-administered medications for this visit.      reports that he has quit smoking. His smoking use included cigarettes. He has a 15 pack-year smoking history. He has been exposed to tobacco smoke. He has never used smokeless tobacco. He reports that he does not currently use alcohol. He reports that he does not use drugs.  Allergies:  Bee venom protein (honey bee) and Escitalopram oxalate    ROS: See HPI  CONSTITUTIONAL: Chills- none. Fever- none. Weight change appropriate for age.  HEENT: Headache- Negative.  Change in vision- none.  Ear pain- none. Nasal congestion- none. Post-nasal drip-none.  Sore throat-none.  CARDIOLOGY: Chest pain- none.  Leg edema-trace.  Murmurs-soft systolic.  Palpitation- none.  RESPIRATORY: Denies any shortness of breath.  GI: Abdominal pain- none.  Change in bowel habits- none.  Constipation- none.  Diarrhea- none.  Nausea- none.  Vomiting- none.  MUSCULOSKELETAL: Joint pain- none.  Muscle aches- none.  DERMATOLOGY: Rash- none.  NEUROLOGY: Dizziness- none.   Headache- none.  PSYCHIATRY: Denies any depression or anxiety     Vitals:    07/28/25 1550   BP: 121/67   Pulse: 75   SpO2: 94%   Weight: 81.6 kg (180 lb)   Height: 1.676 m (5' 6\")   PainSc: 0-No pain      BMI:Body mass index is 29.05 kg/m².   General Cardiology:  General Appearance: Alert, oriented and in no acute distress.  HEENT: extra ocular movements intact (EOMI), pupils equal,  round, reactive to light and accommodation (PERRLA).  Carotid Upstroke: no bruit, normal.  Jugular Venous Distention (JVD): flat.  Chest: normal.  Lungs: Clear to auscultation,   Heart Sounds: no S3 or S4, normal S1, S2, regular rate.  Murmur, Click, Gallop: soft systolic murmur.  Abdomen: no hepatomegaly, no masses felt, soft.  Extremities: no " leg edema.  Peripheral pulses: 2 plus bilateral.  NEUROLOGY Cranial nerves II-XII grossly intact.     Last Labs:  CMP:  Recent Labs     02/17/25  1143 08/15/23  1240 10/01/21  1719    139 141   K 4.2 4.9 3.8    105 108*   CO2 28 24 23*   ANIONGAP 8 10 10   BUN 27* 26* 17   CREATININE 0.83 1.0 0.9   EGFR 95 82 90   GLUCOSE 91 95 101*     Recent Labs     02/17/25  1143 08/15/23  1240 10/01/21  1719   ALBUMIN 4.5 4.4 4.1   ALKPHOS 76 96 81   ALT 17 28 15   AST 15 18 13   BILITOT 1.3* 0.8 0.7     CBC:  Recent Labs     02/17/25  1143 08/15/23  1240 12/10/21  1032   WBC 7.3 7.3 7.0   HGB 12.0* 11.5* 12.4*   HCT 39.9 37.4* 39.2*    287 250   MCV 66.5* 64.6* 63.8*     COAG:   Recent Labs     07/09/21  1654   INR 1.0     HEME/ENDO:  Recent Labs     02/17/25  1143 12/10/21  1032 05/26/21  1730 04/15/21  1438 09/17/20  1500   FERRITIN  --  488*  --   --  510*   IRONSAT  --  36.9  --   --   --    TSH 1.65  --  1.89  --   --    HGBA1C  --   --   --  5.8*  --       CARDIAC:   Recent Labs     11/12/21  1458        Recent Labs     05/14/25  1036 02/17/25  1143 08/15/23  1240   CHOL 117 205* 226*   LDLCALC 50 130* 154*   HDL 50 49 54   TRIG 89 149 90       Last Cardiology Tests:  Echo:  Echo Results:  No results found for this or any previous visit from the past 3650 days.     Cath:  Stress Test:  Stress Results:  No results found for this or any previous visit from the past 365 days.     Cardiac Imaging:    Problem List Items Addressed This Visit       Angina pectoris    Essential hypertension - Primary    Other hyperlipidemia      70-year-old male patient is fairly active no active chest pain tightness was recently went to the [facility] with chest pressure tightness symptoms.  1.  Hypertension: Continue lisinopril 10 mg tablet once a day goal to keep blood pressure below 125/75.  2.  Mixed hyperlipidemia: Continue current Crestor 10 mg tablet p.o. daily.  3.  Chest pain: More likely typical chest  pain although I will schedule patient for EKG treadmill stress test.    Advised patient to avoid lunch meats, canned soups, pizzas, bread rolls, and sandwiches. Advised patient to limit salt intake 1,500 mg daily. Advised patient to exercise 30 mins/3 times a week including treadmill or aerobic type, Goal to achieve 65% target HR.    Diet and exercise reviewed with patient..advice to walk about 10,000 steps or about 2 hours during day time. Cut back on salt, sugar and flour.    Pt. care time is spent includes independent review of diagnostic tests, labs, radiographs, EKGs and coordination of care. Assessment, impression and plans are reflected in the note above as well as the orders.    Lorenzo Kerr MD  [facility] [place]  [facility]

## 2025-08-07 ENCOUNTER — HOSPITAL ENCOUNTER (OUTPATIENT)
Dept: CARDIOLOGY | Facility: HOSPITAL | Age: 70
Discharge: HOME | End: 2025-08-07
Payer: MEDICARE

## 2025-08-07 DIAGNOSIS — E78.5 HYPERLIPIDEMIA, UNSPECIFIED: ICD-10-CM

## 2025-08-07 DIAGNOSIS — I10 ESSENTIAL HYPERTENSION: ICD-10-CM

## 2025-08-07 DIAGNOSIS — I20.9 ANGINA PECTORIS: ICD-10-CM

## 2025-08-07 DIAGNOSIS — E78.49 OTHER HYPERLIPIDEMIA: ICD-10-CM

## 2025-08-07 PROCEDURE — 93017 CV STRESS TEST TRACING ONLY: CPT

## 2025-10-01 ENCOUNTER — APPOINTMENT (OUTPATIENT)
Dept: OPHTHALMOLOGY | Facility: CLINIC | Age: 70
End: 2025-10-01
Payer: MEDICARE